# Patient Record
Sex: MALE | Race: ASIAN | URBAN - METROPOLITAN AREA
[De-identification: names, ages, dates, MRNs, and addresses within clinical notes are randomized per-mention and may not be internally consistent; named-entity substitution may affect disease eponyms.]

---

## 2018-06-22 ENCOUNTER — HOSPITAL ENCOUNTER (EMERGENCY)
Facility: CLINIC | Age: 53
Discharge: HOME | End: 2018-06-22
Attending: EMERGENCY MEDICINE | Admitting: EMERGENCY MEDICINE
Payer: COMMERCIAL

## 2018-06-22 VITALS
WEIGHT: 145 LBS | TEMPERATURE: 98.1 F | SYSTOLIC BLOOD PRESSURE: 138 MMHG | RESPIRATION RATE: 18 BRPM | OXYGEN SATURATION: 98 % | HEART RATE: 75 BPM | DIASTOLIC BLOOD PRESSURE: 79 MMHG

## 2018-06-22 DIAGNOSIS — H10.12 ALLERGIC CONJUNCTIVITIS, LEFT: ICD-10-CM

## 2018-06-22 DIAGNOSIS — H11.32 SUBCONJUNCTIVAL HEMORRHAGE OF LEFT EYE: ICD-10-CM

## 2018-06-22 PROCEDURE — 99283 EMERGENCY DEPT VISIT LOW MDM: CPT | Performed by: EMERGENCY MEDICINE

## 2018-06-22 PROCEDURE — 99283 EMERGENCY DEPT VISIT LOW MDM: CPT | Mod: Z6 | Performed by: EMERGENCY MEDICINE

## 2018-06-22 PROCEDURE — 25000125 ZZHC RX 250: Performed by: EMERGENCY MEDICINE

## 2018-06-22 RX ORDER — OLOPATADINE HYDROCHLORIDE 1 MG/ML
1 SOLUTION/ DROPS OPHTHALMIC 2 TIMES DAILY
Qty: 10 ML | Refills: 0 | Status: SHIPPED | OUTPATIENT
Start: 2018-06-22 | End: 2018-07-22

## 2018-06-22 RX ORDER — TETRACAINE HYDROCHLORIDE 5 MG/ML
1-2 SOLUTION OPHTHALMIC ONCE
Status: COMPLETED | OUTPATIENT
Start: 2018-06-22 | End: 2018-06-22

## 2018-06-22 RX ADMIN — TETRACAINE HYDROCHLORIDE 2 DROP: 5 SOLUTION OPHTHALMIC at 23:47

## 2018-06-22 ASSESSMENT — ENCOUNTER SYMPTOMS
EYE REDNESS: 1
EYE DISCHARGE: 1
EYE ITCHING: 1

## 2018-06-22 NOTE — ED AVS SNAPSHOT
[facility] Emergency Department    [address] [place] [address]    Phone:  [phone]    Fax:  [phone]                                       Naida Mcclure   MRN: [id number]    Department:  [facility] Emergency Department   Date of Visit:  6/22/2018           After Visit Summary Signature Page     I have received my discharge instructions, and my questions have been answered. I have discussed any challenges I see with this plan with the nurse or doctor.    ..........................................................................................................................................  Patient/Patient Representative Signature      ..........................................................................................................................................  Patient Representative Print Name and Relationship to Patient    ..................................................               ................................................  Date                                            Time    ..........................................................................................................................................  Reviewed by Signature/Title    ...................................................              ..............................................  Date                                                            Time

## 2018-06-22 NOTE — ED AVS SNAPSHOT
[facility] Emergency Department    [address] [place] [address]     [place] [address]    Phone:  [phone]    Fax:  [phone]                                       Naida Mcclure   MRN: [id number]    Department:  [facility] Emergency Department   Date of Visit:  6/22/2018           Patient Information     Date Of Birth          1965        Your diagnoses for this visit were:     Allergic conjunctivitis, left     Subconjunctival hemorrhage of left eye        You were seen by Brian Griffin MD.      Follow-up Information     Follow up with Clinic, [facility].    Why:  As needed    Contact information:    [address] [place] [address] [phone]        Discharge References/Attachments     CONJUNCTIVITIS, ALLERGIC (ENGLISH)    HEMORRHAGE, SUBCONJUNCTIVAL  (ENGLISH)      24 Hour Appointment Hotline       To make an appointment at any [facility], call [phone] ([phone]). If you don't have a family doctor or clinic, we will help you find one. [facility] are conveniently located to serve the needs of you and your family.             Review of your medicines      START taking        Dose / Directions Last dose taken    olopatadine 0.1 % ophthalmic solution   Commonly known as:  PATANOL   Dose:  1 drop   Quantity:  10 mL        Apply 1 drop to eye 2 times daily   Refills:  0                Prescriptions were sent or printed at these locations (1 Prescription)                   [facility] - [place], [place] - [address] [place] [address]    Telephone:  [phone]   Fax:  [phone]   Hours:                  E-Prescribed (1 of 1)         olopatadine (PATANOL) 0.1 % ophthalmic solution                Orders Needing Specimen Collection     None      Pending Results     No orders found from 6/20/2018 to 6/23/2018.            Pending Culture Results     No orders found from 6/20/2018 to 6/23/2018.           "  Pending Results Instructions     If you had any lab results that were not finalized at the time of your Discharge, you can call the ED Lab Result RN at [phone]. You will be contacted by this team for any positive Lab results or changes in treatment. The nurses are available 7 days a week from 10A to 6:30P.  You can leave a message 24 hours per day and they will return your call.        Thank you for choosing [facility]       Thank you for choosing [facility] for your care. Our goal is always to provide you with excellent care. Hearing back from our patients is one way we can continue to improve our services. Please take a few minutes to complete the written survey that you may receive in the mail after you visit with us. Thank you!        [facility] [employer] Information     [employer] lets you send messages to your doctor, view your test results, renew your prescriptions, schedule appointments and more. To sign up, go to [email or web address] . Click on \"Log in\" on the left side of the screen, which will take you to the Welcome page. Then click on \"Sign up Now\" on the right side of the page.     You will be asked to enter the access code listed below, as well as some personal information. Please follow the directions to create your username and password.     Your access code is: [email or web address]  Expires: 2018 11:27 PM     Your access code will  in 90 days. If you need help or a new code, please call your [facility] clinic or [phone].        Care EveryWhere ID     This is your Care EveryWhere ID. This could be used by other organizations to access your [facility] medical records  [id number]        Equal Access to Services     [facility] : Hadii florecita Siu, waaxda luqadaha, qaybta kaalmaheriberto mejia. So [facility] [phone].    ATENCIÓN: Si habla español, tiene a archer disposición servicios gratuitos de asistencia lingüística. Llame al [phone].    We comply " with applicable federal civil rights laws and [place] laws. We do not discriminate on the basis of race, color, national origin, age, disability, sex, sexual orientation, or gender identity.            After Visit Summary       This is your record. Keep this with you and show to your community pharmacist(s) and doctor(s) at your next visit.

## 2018-06-23 NOTE — ED TRIAGE NOTES
Pt here with family.  He speaks mandarin.  He states via his son that he has noted blood in sclera of left eye and then tonight looks like a blister on the eye.  He wonders if some oil may have splashed in his eye.  No pain, but itches. He does not wear glasses or contacts.  He can not remember anything specific that could have caused.

## 2018-06-23 NOTE — ED NOTES
Video interpreting service was used for the whole visit including discharge instructions.  Reviewed discharge instructions with pt and his family through the  service.  No additional questions or concerns.

## 2018-06-23 NOTE — ED PROVIDER NOTES
History     Chief Complaint   Patient presents with     Eye Problem     The history is provided by the patient and the spouse. The history is limited by a language barrier. A  was used (Mandarin Chinese).     Naida Mcclure is a 53 year old male who resents the ED for evaluation of left eye redness, itching, and swelling.  Patient states that symptoms started several days ago.  He became worried because now there is blood in the light of his left eye.  He is concerned that he may have gotten some hot oil in the eye.    Problem List:    There are no active problems to display for this patient.       Past Medical History:    History reviewed. No pertinent past medical history.    Past Surgical History:    History reviewed. No pertinent surgical history.    Family History:    No family history on file.    Social History:  Marital Status:    Social History   Substance Use Topics     Smoking status: Not on file     Smokeless tobacco: Not on file     Alcohol use Not on file        Medications:      olopatadine (PATANOL) 0.1 % ophthalmic solution         Review of Systems   Eyes: Positive for discharge (Increased tearing), redness and itching.   All other systems reviewed and are negative.      Physical Exam   BP: 138/79  Pulse: 75  Temp: 98.1  F (36.7  C)  Resp: 18  Weight: 65.8 kg (145 lb)  SpO2: 98 %      Physical Exam   Constitutional: He appears well-developed. No distress.   HENT:   Head: Atraumatic.   Eyes:       The eye was anesthetized with tetracaine and fluorescein was instilled.  There was no uptake of fluorescein dye visualized using a Woods lamp.   Nursing note and vitals reviewed.      ED Course     ED Course     Procedures               No results found for this or any previous visit (from the past 24 hour(s)).    Medications   tetracaine (PONTOCAINE) 0.5 % ophthalmic solution 1-2 drop (1 drop Left Eye Handoff 6/22/18 2048)       Assessments & Plan (with Medical Decision Making)  Naida Mcclure is  a 53-year-old male presenting to the ED for evaluation of a itchy, teary, red eye.  Symptoms started several days ago but he became concerned when there was blood in the white of the eye.  He denies any pain.  He has had no visual changes.  On examination, he has a sub-conjunctival hemorrhage and some scleral edema.  This is most likely due to allergic conjunctivitis.  Will put him on Patanol eyedrops for symptomatic relief.  I reassured him that the blood will reabsorb and as long as he had no visual changes he should be fine.  Did review with him indications return to the ED for reevaluation.  All questions from patient were answered and he was suitable for discharge in satisfactory condition.     I have reviewed the nursing notes.    I have reviewed the findings, diagnosis, plan and need for follow up with the patient.       New Prescriptions    OLOPATADINE (PATANOL) 0.1 % OPHTHALMIC SOLUTION    Apply 1 drop to eye 2 times daily       Final diagnoses:   Allergic conjunctivitis, left   Subconjunctival hemorrhage of left eye       6/22/2018   [facility] EMERGENCY DEPARTMENT     Brian Griffin MD  06/22/18 7336